# Patient Record
Sex: MALE | Race: OTHER | HISPANIC OR LATINO | URBAN - METROPOLITAN AREA
[De-identification: names, ages, dates, MRNs, and addresses within clinical notes are randomized per-mention and may not be internally consistent; named-entity substitution may affect disease eponyms.]

---

## 2017-01-26 ENCOUNTER — EMERGENCY (EMERGENCY)
Facility: HOSPITAL | Age: 22
LOS: 1 days | Discharge: HOME | End: 2017-01-26
Attending: EMERGENCY MEDICINE | Admitting: EMERGENCY MEDICINE
Payer: SELF-PAY

## 2017-01-26 VITALS
SYSTOLIC BLOOD PRESSURE: 116 MMHG | WEIGHT: 139.99 LBS | DIASTOLIC BLOOD PRESSURE: 74 MMHG | RESPIRATION RATE: 18 BRPM | HEART RATE: 62 BPM | TEMPERATURE: 98 F | OXYGEN SATURATION: 97 %

## 2017-01-26 DIAGNOSIS — M79.671 PAIN IN RIGHT FOOT: ICD-10-CM
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
## 2017-01-26 DIAGNOSIS — S90.851A SUPERFICIAL FOREIGN BODY, RIGHT FOOT, INITIAL ENCOUNTER: ICD-10-CM
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
## 2017-01-26 PROBLEM — Z00.00 ENCOUNTER FOR PREVENTIVE HEALTH EXAMINATION: Status: ACTIVE | Noted: 2017-01-26

## 2017-01-26 PROCEDURE — 99283 EMERGENCY DEPT VISIT LOW MDM: CPT | Mod: 25

## 2017-01-26 PROCEDURE — 73630 X-RAY EXAM OF FOOT: CPT | Mod: 26,RT

## 2017-01-26 PROCEDURE — 99283 EMERGENCY DEPT VISIT LOW MDM: CPT

## 2017-01-26 PROCEDURE — 73630 X-RAY EXAM OF FOOT: CPT

## 2017-01-26 NOTE — ED PROVIDER NOTE - MEDICAL DECISION MAKING DETAILS
20 y/o m foreign body to bottom of right foot x 2 months; no signs of infection, will refer to podiatry.

## 2017-01-26 NOTE — ED ADULT NURSE NOTE - CHPI ED SYMPTOMS NEG
no chills/no drainage/no bleeding/no vomiting/no fever/no redness/no bleeding at site/no red streaks/no purulent drainage

## 2017-01-26 NOTE — ED ADULT NURSE NOTE - OBJECTIVE STATEMENT
pt is a 22 y/o male c/o right foot pain for one month after he stepped on glass one month ago. pt never received treatment for the foot [id number] "feels like something is still inside my foot." pt took tylenol for the pain 1 week ago, denies any medication today. no significant PMH. denies any fever/chills, SOB, n/v/d. able to walk, a&ox3, speaking in full sentences. no redness on bottom of foot or s/s infection. continue to monitor.

## 2017-01-26 NOTE — ED PROVIDER NOTE - MUSCULOSKELETAL, MLM
small callous to plantar aspect of right foot in area of distal 1st metatarsal, no erythema, no fluctuance

## 2017-01-26 NOTE — ED ADULT TRIAGE NOTE - CHIEF COMPLAINT QUOTE
Patient c/o rt foot pain for 2 mos . Stated that 2 months ago he step on the broken bottle at work .

## 2017-01-26 NOTE — ED PROVIDER NOTE - OBJECTIVE STATEMENT
20 y/o m with no pmh presents c/o pain to bottom of right foot x 2 months after stepping on glass.  Pt reports there was small wound initially, which has since healed.   Denies numbness/tingling, fever, redness, all other ROS negative.

## 2017-01-26 NOTE — ED PROVIDER NOTE - DIAGNOSTIC INTERPRETATION
xray right foot- no fracture or dislocation, +radioopaque foreign body plantar aspect of distal foot

## 2017-02-09 ENCOUNTER — APPOINTMENT (OUTPATIENT)
Dept: INTERNAL MEDICINE | Facility: CLINIC | Age: 22
End: 2017-02-09

## 2018-03-09 NOTE — ED ADULT NURSE NOTE - NS ED NOTE  TALK SOMEONE YN
Has not been seen in clinic in more than a year. Will arrange follow up.   
Left a message for patient to contact sleep clinic to schedule a medication follow up with Dr. Pacheco.  
no

## 2019-08-01 NOTE — ED PROVIDER NOTE - CHIEF COMPLAINT
The patient is a 21y Male complaining of Undermining Location (Optional): in the superficial subcutaneous fat

## 2022-10-20 NOTE — ED PROVIDER NOTE - NS ED ATTENDING STATEMENT MOD
(M6) obeys commands I have reviewed and agree with all pertinent clinical information, including history and physical exam and agree with treatment plan of the PA.